# Patient Record
Sex: MALE
[De-identification: names, ages, dates, MRNs, and addresses within clinical notes are randomized per-mention and may not be internally consistent; named-entity substitution may affect disease eponyms.]

---

## 2017-09-16 ENCOUNTER — HOSPITAL ENCOUNTER (EMERGENCY)
Dept: HOSPITAL 25 - UCEAST | Age: 37
Discharge: HOME | End: 2017-09-16
Payer: COMMERCIAL

## 2017-09-16 VITALS — SYSTOLIC BLOOD PRESSURE: 119 MMHG | DIASTOLIC BLOOD PRESSURE: 80 MMHG

## 2017-09-16 DIAGNOSIS — Z88.0: ICD-10-CM

## 2017-09-16 DIAGNOSIS — J20.9: Primary | ICD-10-CM

## 2017-09-16 PROCEDURE — G0463 HOSPITAL OUTPT CLINIC VISIT: HCPCS

## 2017-09-16 PROCEDURE — 99212 OFFICE O/P EST SF 10 MIN: CPT

## 2017-09-16 NOTE — UC
Respiratory Complaint HPI





- History of Current Complaint


Chief Complaint: UCRespiratory


Stated Complaint: ACUTE BRONCHITIS GETTING WORSE


Time Seen by Provider: 09/16/17 20:38


Hx Obtained From: Patient


Onset/Duration: Gradual Onset - has been coughing for over a week, was dx with 

bronchitis and treated with tesselon perles (no antibx). tesselon works to stop 

cough but when it wears off, he coughs up "green phlegm"


Severity Initially: Mild


Severity Currently: Moderate - feels tired and still coughing


Character: Cough: Productive


Aggravating Factors: Exertion, Deep Breaths, Recumbent Position


Alleviating Factors: Nothing - tesselon perles


Associated Signs And Symptoms: Positive: Fever, Chills





- Allergies/Home Medications


Allergies/Adverse Reactions: 


 Allergies











Allergy/AdvReac Type Severity Reaction Status Date / Time


 


Penicillins Allergy  Itching Verified 09/16/17 20:00











Home Medications: 


 Home Medications





Benzonatate CAP* [Tessalon 100 MG CAP*] 1 cap PO TID 09/16/17 [History 

Confirmed 09/16/17]


Loratadine 1 cap PO DAILY 09/16/17 [History Confirmed 09/16/17]


Phenylephrine-Doxylamine-Dextr [Nyquil Severe Cold/Flu 5-6.25- mg/15Ml] 

20 ml PO ONCE 09/16/17 [History Confirmed 09/16/17]


Pseudoephedrine HCl [Sudafed 24 Hour] 1 cap PO DAILY 09/16/17 [History 

Confirmed 09/16/17]


Pseudoephedrine-Guaifenesin [Mucinex D  mg] 1 cap PO Q8HR 09/16/17 [

History Confirmed 09/16/17]











PMH/Surg Hx/FS Hx/Imm Hx


Previously Healthy: Yes


Respiratory History: Other - seasonal allergy


Other Respiratory History: allergy





- Surgical History


Surgical History: Yes


Surgery Procedure, Year, and Place: Appy.  Right ankle





- Family History


Known Family History: Positive: None





- Social History


Occupation: Employed Full-time - IT


Alcohol Use: Occasionally


Substance Use Type: None


Smoking Status (MU): Former Smoker





Review of Systems


Constitutional: Fever, Chills, Fatigue


Skin: Negative


Respiratory: Cough


Cardiovascular: Negative


Gastrointestinal: Negative


Neurological: Negative


Psychological: Negative


All Other Systems Reviewed And Are Negative: Yes





Physical Exam


Triage Information Reviewed: Yes


Appearance: Well-Appearing, No Pain Distress, Well-Nourished


Vital Signs: 


 Initial Vital Signs











Temp  98.1 F   09/16/17 19:56


 


Pulse  87   09/16/17 19:56


 


Resp  18   09/16/17 19:56


 


BP  119/80   09/16/17 19:56


 


Pulse Ox  100   09/16/17 19:56











Eyes: Positive: Conjunctiva Clear


ENT: Positive: Pharynx normal, TMs normal


Neck exam: Normal


Respiratory: Positive: Crackles - L lower base, Other: - prod cough.  Negative: 

Respiratory distress, Decreased breath sounds, Wheezing


Cardiovascular Exam: Normal


Neurological Exam: Normal


Psychological Exam: Normal


Skin Exam: Normal


Skin: Negative: rashes





UC Diagnostic Evaluation





- Laboratory


O2 Sat by Pulse Oximetry: 100





Respiratory Course/Dx





- Differential Dx/Diagnosis


Differential Diagnosis/HQI/PQRI: Asthma, Bronchitis, Lower Resp Infection, 

Sinusitis


Provider Diagnoses: acute bronchitis





Discharge





- Discharge Plan


Condition: Stable


Disposition: HOME


Prescriptions: 


Azithromycin TAB* [Zithromax TAB (Z-MASSIEL) 250 mg #6 tabs] 250 mg PO DAILY #4 tab


Patient Education Materials:  Acute Bronchitis (ED)


Referrals: 


Joe Matthews MD [Primary Care Provider] - 3 Days (if no better)


Additional Instructions: 


take antibiotic as prescribed





drink plenty of fluids





use over the counter ibuprofen for pain and fever





use tesselon cough gels as prescribed on bottle 





Report to ER if you become short of breath

## 2019-11-17 ENCOUNTER — HOSPITAL ENCOUNTER (EMERGENCY)
Dept: HOSPITAL 25 - ED | Age: 39
Discharge: HOME | End: 2019-11-17
Payer: COMMERCIAL

## 2019-11-17 DIAGNOSIS — Z87.891: ICD-10-CM

## 2019-11-17 DIAGNOSIS — Z88.0: ICD-10-CM

## 2019-11-17 DIAGNOSIS — R51: Primary | ICD-10-CM

## 2019-11-17 DIAGNOSIS — H53.8: ICD-10-CM

## 2019-11-17 LAB
ALBUMIN SERPL BCG-MCNC: 4.7 G/DL (ref 3.2–5.2)
ALBUMIN/GLOB SERPL: 1.7 {RATIO} (ref 1–3)
ALP SERPL-CCNC: 85 U/L (ref 34–104)
ALT SERPL W P-5'-P-CCNC: 18 U/L (ref 7–52)
ANION GAP SERPL CALC-SCNC: 5 MMOL/L (ref 2–11)
AST SERPL-CCNC: 20 U/L (ref 13–39)
BASOPHILS # BLD AUTO: 0.1 10^3/UL (ref 0–0.2)
BUN SERPL-MCNC: 18 MG/DL (ref 6–24)
BUN/CREAT SERPL: 16.8 (ref 8–20)
CALCIUM SERPL-MCNC: 9.5 MG/DL (ref 8.6–10.3)
CHLORIDE SERPL-SCNC: 101 MMOL/L (ref 101–111)
EOSINOPHIL # BLD AUTO: 0 10^3/UL (ref 0–0.6)
GLOBULIN SER CALC-MCNC: 2.7 G/DL (ref 2–4)
GLUCOSE SERPL-MCNC: 95 MG/DL (ref 70–100)
HCO3 SERPL-SCNC: 31 MMOL/L (ref 22–32)
HCT VFR BLD AUTO: 43 % (ref 42–52)
HGB BLD-MCNC: 15.1 G/DL (ref 14–18)
LYMPHOCYTES # BLD AUTO: 1.1 10^3/UL (ref 1–4.8)
MCH RBC QN AUTO: 32 PG (ref 27–31)
MCHC RBC AUTO-ENTMCNC: 35 G/DL (ref 31–36)
MCV RBC AUTO: 91 FL (ref 80–94)
MONOCYTES # BLD AUTO: 0.6 10^3/UL (ref 0–0.8)
NEUTROPHILS # BLD AUTO: 2.2 10^3/UL (ref 1.5–7.7)
NRBC # BLD AUTO: 0 10^3/UL
NRBC BLD QL AUTO: 0.1
PLATELET # BLD AUTO: 177 10^3/UL (ref 150–450)
POTASSIUM SERPL-SCNC: 3.7 MMOL/L (ref 3.5–5)
PROT SERPL-MCNC: 7.4 G/DL (ref 6.4–8.9)
RBC # BLD AUTO: 4.68 10^6 /UL (ref 4.18–5.48)
SODIUM SERPL-SCNC: 137 MMOL/L (ref 135–145)
WBC # BLD AUTO: 4 10^3/UL (ref 3.5–10.8)

## 2019-11-17 PROCEDURE — 85025 COMPLETE CBC W/AUTO DIFF WBC: CPT

## 2019-11-17 PROCEDURE — 99282 EMERGENCY DEPT VISIT SF MDM: CPT

## 2019-11-17 PROCEDURE — 85652 RBC SED RATE AUTOMATED: CPT

## 2019-11-17 PROCEDURE — 80053 COMPREHEN METABOLIC PANEL: CPT

## 2019-11-17 PROCEDURE — 82375 ASSAY CARBOXYHB QUANT: CPT

## 2019-11-17 PROCEDURE — 70450 CT HEAD/BRAIN W/O DYE: CPT

## 2019-11-17 PROCEDURE — 36415 COLL VENOUS BLD VENIPUNCTURE: CPT

## 2019-11-17 NOTE — ED
Headache





- HPI Summary


HPI Summary: 


Patient is a 40 y/o M presenting to [facility] with complaints of HA and visual 

changes. He states that on 10/28/19, he was exposed to carbon monoxide at work 

for approximately an hour. Patient reports that he had been experiencing some 

HAs two days prior to the carbon monoxide exposure but notes that, since the 

exposure, his HAs have been more frequent. Patient states that this morning, 11/ 17/19, he had onset of a "small" HA. He went to [place] for grocery shopping 

and reports onset of blurred vision and photophobia alongside an exacerbation 

of his HA. He subsequently returned home. While there, he reports that he began 

"seeing colors". Patient states that he was only able to visualize these colors 

at the periphery of his right eye. Colors are characterized as "like 1950s snow 

on a TV". He also describes intermittently seeing colors in a fluctuating 

pattern similar to a ceiling fan. HA at the time is described as severe. 

Patient took 600 mg ibuprofen PTA without any relief in Sx. He estimates that 

he drank 20 oz coffee this morning and has been drinking Gatorade since Sx 

onset. PSHx of appedectomy and right ankle surgery is noted. He notes 

occasional alcohol consumption but denies tobacco and substance usage. Home 

medications and allergies are reviewed. 








- History Of Current Complaint


Chief Complaint: EDHeadache


Stated Complaint: HEADACHE


Time Seen by Provider: 11/17/19 11:23


Hx Obtained From: Patient


Onset/Duration: Started hours ago, Still Present


Initially Headache Was: Mild


Timing: Hours


Aggravating Factor: Bright Lights


Allevating Factors: Nothing


Associated Signs And Symptoms: Visual Changes





- Allergies/Home Medications


Allergies/Adverse Reactions: 


 Allergies











Allergy/AdvReac Type Severity Reaction Status Date / Time


 


Penicillins Allergy  Itching Verified 11/17/19 11:54














PMH/Surg Hx/FS Hx/Imm Hx


Sensory History: 


   Denies: Hx Legally Blind, Hx Deafness


Opthamlomology History: 


   Denies: Hx Legally Blind


EENT History: 


   Denies: Hx Deafness





- Surgical History


Surgery Procedure, Year, and Place: Appy.  Right ankle


Infectious Disease History: No


Infectious Disease History: 


   Denies: Traveled Outside the [place] in Last 30 Days





- Family History


Known Family History: 


   Negative: Cardiac Disease, Hypertension, Diabetes





- Social History


Alcohol Use: Occasionally


Substance Use Type: Reports: None


Smoking Status (MU): Former Smoker





Review of Systems


Eyes: Other - positive - seeing colors 


Positive: Photophobia, Blurred Vision


Positive: Headache


All Other Systems Reviewed And Are Negative: Yes





Physical Exam





- Summary


Physical Exam Summary: 


VITAL SIGNS: Reviewed.


GENERAL: Patient is a well-developed and nourished male who is lying 

comfortable in the stretcher. Patient is not in any acute respiratory distress.


HEAD AND FACE: No signs of trauma. No ecchymosis, hematomas or skull 

depressions. No sinus tenderness.


EYES: PERRLA, EOMI x 2, No injected conjunctiva, no nystagmus.


EARS: Hearing grossly intact. Ear canals and tympanic membranes are within 

normal limits.


MOUTH: Oropharynx within normal limits.


NECK: Supple, trachea is midline, no adenopathy, no JVD, no carotid bruit, no c-

spine tenderness, neck with full ROM.


CHEST: Symmetric, no tenderness at palpation.


LUNGS: Clear to auscultation bilaterally. No wheezing or crackles.


CVS: Regular rate and rhythm, S1 and S2 present, no murmurs or gallops 

appreciated.


ABDOMEN: Soft, non-tender. No signs of distention. No rebound, no guarding, and 

no masses palpated. Bowel sounds are normal.


EXTREMITIES: FROM in all major joints, no edema, no cyanosis or clubbing.


NEURO: Alert and oriented x 3. No acute neurological deficits. Speech is normal 

and follows commands. GCS 15. NIH 0. 


SKIN: Dry and warm.








Triage Information Reviewed: Yes


Vital Signs On Initial Exam: 


 Initial Vitals











Temp Pulse Resp BP Pulse Ox


 


 97.8 F   76   16   123/91   99 


 


 11/17/19 11:10  11/17/19 11:10  11/17/19 11:10  11/17/19 11:10  11/17/19 11:10











Vital Signs Reviewed: Yes





- Natali Coma Scale


Best Eye Response: 4 - Spontaneous


Best Motor Response: 6 - Obeys Commands


Best Verbal Response: 5 - Oriented


Coma Scale Total: 15





Procedures





- Sedation


Patient Received Moderate/Deep Sedation with Procedure: No





Diagnostics





- Vital Signs


 Vital Signs











  Temp Pulse Resp BP Pulse Ox


 


 11/17/19 11:10  97.8 F  76  16  123/91  99














- Laboratory


Result Diagrams: 


 11/17/19 11:36





 11/17/19 11:36


Lab Statement: Any lab studies that have been ordered have been reviewed, and 

results considered in the medical decision making process.





- CT


  ** BRAIN CT


CT Interpretation Completed By: Radiologist


Summary of CT Findings: IMPRESSION:  Normal CT of the brain. THIS REPORT WAS 

REVIEWED BY DR. CHA.





[employer] Institutes [employer]





- NIH Scale


Level of Consciousness: Alert/Keenly Responsive


Ask Patient the Month and His/Her Age: Both Correct


Ask Pt to Open/Close Eyes and /Release Non-Paretic Hand: Both Correctly


Best Gaze (Only Horizontal Eye Movement): Normal


Visual Field Testing: No Visual Loss


Facial Paresis-Pt to Smile & Close Eyes or Grimace Symmetry: Normal/Symmetrical


Motor Function - Right Arm: No Drift-Holds 10 Seconds


Motor Function - Left Arm: No Drift-Holds 10 Seconds


Motor Function - Right Leg: No Drift-Holds 10 Seconds


Motor Function - Left Leg: No Drift-Holds 10 Seconds


Limb Ataxia-Must be out of Proportion to Weakness Present: Absent


Sensory (Use Pinprick to Test Arms/Legs/Trunk/Face): Normal


Best Language (Describe Picture, Name Items): No Aphasia


Dysarthria (Read Several Words): Normal


Extinction and Inattention: No Abnormality


Total Score: 0





Re-Evaluation





- Re-Evaluation


  ** First Eval


Re-Evaluation Time: 13:52


Change: Improved


Comment: Patient declined any medications since he reports that his symptoms 

have resolved.  He continues to be stable and he is not febrile and has no 

meningeal signs.  At this point, I discussed all the findings and test results 

with the patient. He was instructed to return to the emergency room immediately 

if any of the symptoms return or worsen. Patient understands and agrees. 

Neurological exam before discharge: Patient is alert and oriented x 3. No acute 

neurological deficits. Patient's vital signs are stable. Patient is to follow 

up with PCP in the next 2  3 days. Patient understands and agrees. Plan of 

care was discussed with the patient and the patient understands and agrees with 

the plan of care.  All questions were answered at patient satisfaction.  There 

were no further complaints or concerns.





Headache Course/Dx





- Course


Assessment/Plan: Patient is a 40 y/o male presenting to [facility] with complaints 

of a headache.  Blood work w/o any significant abnormality.  Head CT negative 

for acute intracranial pathology.  Patient declined any medications since he 

reports that his symptoms have resolved.  He continues to be stable and he is 

not febrile and has no meningeal signs.  At this point, I discussed all the 

findings and test results with the patient. He was instructed to return to the 

emergency room immediately if any of the symptoms return or worsen. Patient 

understands and agrees. Neurological exam before discharge: Patient is alert 

and oriented x 3. No acute neurological deficits. Patient's vital signs are 

stable. Patient is to follow up with PCP in the next 2  3 days. Patient 

understands and agrees. Plan of care was discussed with the patient and the 

patient understands and agrees with the plan of care.  All questions were 

answered at patient satisfaction.  There were no further complaints or concerns.





- Diagnoses


Provider Diagnoses: 


 Headache








Discharge ED





- Sign-Out/Discharge


Documenting (check all that apply): Patient Departure - discharge 





- Discharge Plan


Condition: Stable


Disposition: HOME


Patient Education Materials:  General Headache (ED)


Referrals: 


Joe Matthews MD [Primary Care Provider] - 3 Days


Additional Instructions: 


PLEASE RETURN TO ED FOR ANY NEW OR WORSENING SYMPTOMS. PLEASE FOLLOW UP WITH 

YOUR PRIMARY CARE PHYSICIAN WITHIN THE NEXT THREE DAYS. 





- Billing Disposition and Condition


Condition: STABLE


Disposition: Home





- Attestation Statements


Document Initiated by Kai: Yes


Documenting Scribe: BASSEM CHI


Provider For Whom Kai is Documenting (Include Credential): ODILIA CHA MD


Scribe Attestation: 


IBASSEM scribed for ODILIA CHA MD on 11/17/19 at 1845. 


Scribe Documentation Reviewed: Yes


Provider Attestation: 


The documentation as recorded by the BASSEM hardwick accurately reflects 

the service I personally performed and the decisions made by me, ODILIA CHA MD


Status of Scribe Document: Viewed